# Patient Record
Sex: FEMALE
[De-identification: names, ages, dates, MRNs, and addresses within clinical notes are randomized per-mention and may not be internally consistent; named-entity substitution may affect disease eponyms.]

---

## 2019-08-16 ENCOUNTER — HOSPITAL ENCOUNTER (OUTPATIENT)
Dept: HOSPITAL 89 - MAMO | Age: 49
End: 2019-08-16
Attending: FAMILY MEDICINE
Payer: SELF-PAY

## 2019-08-16 DIAGNOSIS — Z12.31: Primary | ICD-10-CM

## 2019-08-16 PROCEDURE — 77067 SCR MAMMO BI INCL CAD: CPT

## 2019-08-16 PROCEDURE — 77063 BREAST TOMOSYNTHESIS BI: CPT

## 2019-08-20 NOTE — RADIOLOGY IMAGING REPORT
FACILITY: [facility] - [place] 

 

PATIENT NAME: JOSE ALEJANDRO THURSTON

: [id number]

MR: [id number]

V: [id number]

EXAM DATE: [id number]

ORDERING PHYSICIAN: JUAN CARLOS DUVALL

TECHNOLOGIST: Porsche Godoy

 

PROCEDURE: BILATERAL DIGITAL SCREENING MAMMOGRAM WITH CAD ASSISTED 

INTERPRETATION & 3D TOMOSYNTHESIS

 

REASON FOR STUDY:  Screening

 

FAMILY HISTORY OF BREAST CANCER:  None

 

BREAST PROCEDURES/TREATMENTS:  Benign aspiration of both breasts.

 

COMPARISON:  Mammograms 18, 16, 9/25/15, 14, 12

 

VIEWS OBTAINED:  Bilateral 2D & 3D full field CC & MLO projections

 

BREAST DENSITY:  The breasts are heterogeneously dense which can 

obscure small masses.

 

MAMMOGRAM FINDINGS: The parenchymal pattern has remained stable 

allowing for difference in mammographic technique & patient 

positioning.

 

 

DIAGNOSTIC CATEGORY 1--NEGATIVE.  

 

 

RECOMMENDATIONS:

ROUTINE MAMMOGRAM AND CLINICAL EVALUATION.   

 

IMPRESSION: 

BIRADS 1: Negative.

 

 

 

Dictated by:  María Fink M.D. on 2019 at 16:06   

Transcribed by: JEANIE on 2019 at 7:35    

Approved by:  María Fink M.D. on 2019 at 8:22   

[facility]